# Patient Record
(demographics unavailable — no encounter records)

---

## 2024-11-04 NOTE — PLAN
[FreeTextEntry1] : 32 y/o presenting for follow up s/p medical management for TOP: -Pelvic sonogram done today showing L ovarian cyst measuring 3.7cm, free fluid noted with possible rpoc; see official sono report -UPT done today in office, negative  -discussed possibility of this being patient's menses -f/u HCG/CBC done today  -RTO 2 weeks for repeat gyn sono when patient is no longer having bleeding

## 2024-11-04 NOTE — HISTORY OF PRESENT ILLNESS
[FreeTextEntry1] : Patient is an 30 y/o presenting for follow up s/p medical management for TOP done at [facility] on 9/25/24 . Admits to having bleeding after completing medication regimen. She reports heavy vaginal bleeding which started last night. Denies subjective fevers and chills.

## 2024-11-04 NOTE — HISTORY OF PRESENT ILLNESS
[FreeTextEntry1] : Patient is an 32 y/o presenting for follow up s/p medical management for TOP done at [facility] on 9/25/24 . Admits to having bleeding after completing medication regimen. She reports heavy vaginal bleeding which started last night. Denies subjective fevers and chills.

## 2024-11-26 NOTE — PLAN
[FreeTextEntry1] : 32 y/o female presents to office for gyn sono  -gyn sono done today wnl, no rpoc noted (see official sono report)  -f/u hcg done today  -patient unsure about birth control, will do research  -f/u prn

## 2024-11-26 NOTE — HISTORY OF PRESENT ILLNESS
[FreeTextEntry1] : Patient is an 32 y/o presenting for follow up s/p medical management for TOP done at [facility] on 9/25/24 . Admits to having bleeding after completing medication regimen. She feels well and without complaints. Denies subjective fevers and chills.

## 2024-11-26 NOTE — HISTORY OF PRESENT ILLNESS
[FreeTextEntry1] : Patient is an 30 y/o presenting for follow up s/p medical management for TOP done at [facility] on 9/25/24 . Admits to having bleeding after completing medication regimen. She feels well and without complaints. Denies subjective fevers and chills.